# Patient Record
Sex: FEMALE | URBAN - METROPOLITAN AREA
[De-identification: names, ages, dates, MRNs, and addresses within clinical notes are randomized per-mention and may not be internally consistent; named-entity substitution may affect disease eponyms.]

---

## 2021-10-26 ENCOUNTER — APPOINTMENT (OUTPATIENT)
Age: 86
Setting detail: DERMATOLOGY
End: 2021-10-26

## 2021-10-26 PROCEDURE — OTHER MIPS QUALITY: OTHER

## 2021-10-26 NOTE — PROCEDURE: MIPS QUALITY
Quality 47: Advance Care Plan: Advance Care Planning discussed and documented; advance care plan or surrogate decision maker documented in the medical record.
Quality 110: Preventive Care And Screening: Influenza Immunization: Influenza Immunization Administered during Influenza season
Quality 154 Part A: Falls: Risk Assessment (Should Be Reported With Measure 155.): Falls risk assessment completed and documented in the past 12 months.
Quality 226: Preventive Care And Screening: Tobacco Use: Screening And Cessation Intervention: Patient screened for tobacco use and is an ex/non-smoker
Quality 130: Documentation Of Current Medications In The Medical Record: Current Medications Documented
Detail Level: Detailed
Quality 431: Preventive Care And Screening: Unhealthy Alcohol Use - Screening: Patient not identified as an unhealthy alcohol user when screened for unhealthy alcohol use using a systematic screening method
Quality 111:Pneumonia Vaccination Status For Older Adults: Pneumococcal Vaccination Previously Received

## 2021-10-26 NOTE — HPI: CHANGING MOLE
Has Your Changing Mole Been Treated?: has not been treated
Additional History: BCC on nose removed on 2000.

## 2022-01-28 ENCOUNTER — PREPPED CHART (OUTPATIENT)
Dept: URBAN - METROPOLITAN AREA CLINIC 49 | Facility: CLINIC | Age: 87
End: 2022-01-28

## 2022-01-28 PROBLEM — H35.3221 NEOVASCULAR AMD WITH ACTIVE CNV: Status: STABILIZING | Noted: 2022-01-28

## 2022-01-28 PROBLEM — H35.722: Noted: 2022-01-28

## 2022-01-28 PROBLEM — H35.3112 DRY AMD, INTERMEDIATE DRY STAGE: Noted: 2022-01-28

## 2022-01-28 PROBLEM — H35.3112 DRY AMD, INTERMEDIATE DRY STAGE: Status: STABILIZING | Noted: 2022-01-28

## 2022-01-28 PROBLEM — H43.813 POSTERIOR VITREOUS DETACHMENT: Noted: 2022-01-28

## 2022-01-28 PROBLEM — H35.372 EPIRETINAL MEMBRANE: Noted: 2022-01-28

## 2022-01-28 PROBLEM — H35.3221 NEOVASCULAR AMD WITH ACTIVE CNV: Noted: 2022-01-28

## 2022-01-28 PROBLEM — H35.722 PIGMENT EPITHELIAL DETACHMENT: Noted: 2022-01-28

## 2022-02-28 ASSESSMENT — VISUAL ACUITY
OS_PH: 20/80+2
OS_CC: 20/100+2
OD_CC: 20/20-1

## 2022-02-28 ASSESSMENT — TONOMETRY
OD_IOP_MMHG: 17
OS_IOP_MMHG: 17

## 2022-03-04 ENCOUNTER — FOLLOW UP (OUTPATIENT)
Dept: URBAN - METROPOLITAN AREA CLINIC 49 | Facility: CLINIC | Age: 87
End: 2022-03-04

## 2022-03-04 DIAGNOSIS — H35.3112: ICD-10-CM

## 2022-03-04 DIAGNOSIS — H35.3221: ICD-10-CM

## 2022-03-04 PROCEDURE — 92014 COMPRE OPH EXAM EST PT 1/>: CPT | Mod: 25

## 2022-03-04 PROCEDURE — 92134 CPTRZ OPH DX IMG PST SGM RTA: CPT

## 2022-03-04 PROCEDURE — 67028 INJECTION EYE DRUG: CPT

## 2022-03-04 ASSESSMENT — TONOMETRY
OD_IOP_MMHG: 16
OS_IOP_MMHG: 14

## 2022-03-04 ASSESSMENT — VISUAL ACUITY
OD_CC: 20/20
OS_SC: 20/70-1
OS_CC: 20/60-1
OD_SC: 20/25+2

## 2022-04-01 ENCOUNTER — INJECTION ONLY (OUTPATIENT)
Dept: URBAN - METROPOLITAN AREA CLINIC 49 | Facility: CLINIC | Age: 87
End: 2022-04-01

## 2022-04-01 DIAGNOSIS — H35.3112: ICD-10-CM

## 2022-04-01 DIAGNOSIS — H35.3221: ICD-10-CM

## 2022-04-01 PROCEDURE — 67028 INJECTION EYE DRUG: CPT

## 2022-04-01 PROCEDURE — 92134 CPTRZ OPH DX IMG PST SGM RTA: CPT

## 2022-04-01 ASSESSMENT — VISUAL ACUITY
OS_PH: 20/50-2
OD_PH: 20/20-1
OS_SC: 20/70-1
OD_SC: 20/30-1

## 2022-04-01 ASSESSMENT — TONOMETRY
OD_IOP_MMHG: 18
OS_IOP_MMHG: 14

## 2022-04-29 ENCOUNTER — FOLLOW UP (OUTPATIENT)
Dept: URBAN - METROPOLITAN AREA CLINIC 49 | Facility: CLINIC | Age: 87
End: 2022-04-29

## 2022-04-29 DIAGNOSIS — H35.3221: ICD-10-CM

## 2022-04-29 DIAGNOSIS — H35.3112: ICD-10-CM

## 2022-04-29 DIAGNOSIS — H35.722: ICD-10-CM

## 2022-04-29 DIAGNOSIS — H35.372: ICD-10-CM

## 2022-04-29 PROCEDURE — 67028 INJECTION EYE DRUG: CPT

## 2022-04-29 PROCEDURE — 92202 OPSCPY EXTND ON/MAC DRAW: CPT | Mod: 59

## 2022-04-29 PROCEDURE — 92014 COMPRE OPH EXAM EST PT 1/>: CPT | Mod: 25

## 2022-04-29 PROCEDURE — PFS EYLEA PFS

## 2022-04-29 PROCEDURE — 92134 CPTRZ OPH DX IMG PST SGM RTA: CPT

## 2022-04-29 ASSESSMENT — TONOMETRY
OD_IOP_MMHG: 17
OS_IOP_MMHG: 16

## 2022-04-29 ASSESSMENT — VISUAL ACUITY
OS_CC: 20/60+1
OD_CC: 20/25-1

## 2022-05-27 ENCOUNTER — PROCEDURE ONLY (OUTPATIENT)
Dept: URBAN - METROPOLITAN AREA CLINIC 49 | Facility: CLINIC | Age: 87
End: 2022-05-27

## 2022-05-27 DIAGNOSIS — H35.3221: ICD-10-CM

## 2022-05-27 DIAGNOSIS — H35.3112: ICD-10-CM

## 2022-05-27 PROCEDURE — PFS EYLEA PFS

## 2022-05-27 PROCEDURE — 67028 INJECTION EYE DRUG: CPT

## 2022-05-27 PROCEDURE — 92134 CPTRZ OPH DX IMG PST SGM RTA: CPT

## 2022-05-27 ASSESSMENT — TONOMETRY
OD_IOP_MMHG: 12
OS_IOP_MMHG: 11

## 2022-05-27 ASSESSMENT — VISUAL ACUITY
OD_CC: 20/25-2
OS_CC: 20/60-2

## 2022-06-27 ENCOUNTER — FOLLOW UP (OUTPATIENT)
Dept: URBAN - METROPOLITAN AREA CLINIC 49 | Facility: CLINIC | Age: 87
End: 2022-06-27

## 2022-06-27 DIAGNOSIS — H35.3112: ICD-10-CM

## 2022-06-27 DIAGNOSIS — H35.722: ICD-10-CM

## 2022-06-27 DIAGNOSIS — H35.3221: ICD-10-CM

## 2022-06-27 DIAGNOSIS — H35.372: ICD-10-CM

## 2022-06-27 DIAGNOSIS — H43.813: ICD-10-CM

## 2022-06-27 PROCEDURE — 92014 COMPRE OPH EXAM EST PT 1/>: CPT | Mod: 25

## 2022-06-27 PROCEDURE — PFS EYLEA PFS

## 2022-06-27 PROCEDURE — 67028 INJECTION EYE DRUG: CPT

## 2022-06-27 PROCEDURE — 92134 CPTRZ OPH DX IMG PST SGM RTA: CPT

## 2022-06-27 ASSESSMENT — TONOMETRY
OS_IOP_MMHG: 14
OD_IOP_MMHG: 15

## 2022-06-27 ASSESSMENT — VISUAL ACUITY
OS_CC: 20/200
OD_CC: 20/40+2

## 2022-08-05 ENCOUNTER — FOLLOW UP (OUTPATIENT)
Dept: URBAN - METROPOLITAN AREA CLINIC 49 | Facility: CLINIC | Age: 87
End: 2022-08-05

## 2022-08-05 DIAGNOSIS — H35.3112: ICD-10-CM

## 2022-08-05 DIAGNOSIS — H35.372: ICD-10-CM

## 2022-08-05 DIAGNOSIS — H43.813: ICD-10-CM

## 2022-08-05 DIAGNOSIS — H35.3221: ICD-10-CM

## 2022-08-05 DIAGNOSIS — H35.722: ICD-10-CM

## 2022-08-05 PROCEDURE — 92014 COMPRE OPH EXAM EST PT 1/>: CPT | Mod: 25

## 2022-08-05 PROCEDURE — 67028 INJECTION EYE DRUG: CPT

## 2022-08-05 PROCEDURE — 92202 OPSCPY EXTND ON/MAC DRAW: CPT | Mod: 59

## 2022-08-05 PROCEDURE — 92134 CPTRZ OPH DX IMG PST SGM RTA: CPT

## 2022-08-05 PROCEDURE — PFS EYLEA PFS

## 2022-08-05 ASSESSMENT — VISUAL ACUITY
OS_CC: 20/70-2
OD_CC: 20/40

## 2022-08-05 ASSESSMENT — TONOMETRY
OS_IOP_MMHG: 14
OD_IOP_MMHG: 17

## 2022-09-30 ENCOUNTER — FOLLOW UP (OUTPATIENT)
Dept: URBAN - METROPOLITAN AREA CLINIC 49 | Facility: CLINIC | Age: 87
End: 2022-09-30

## 2022-09-30 DIAGNOSIS — H35.3112: ICD-10-CM

## 2022-09-30 DIAGNOSIS — H35.722: ICD-10-CM

## 2022-09-30 DIAGNOSIS — H43.813: ICD-10-CM

## 2022-09-30 DIAGNOSIS — H35.3221: ICD-10-CM

## 2022-09-30 DIAGNOSIS — H35.372: ICD-10-CM

## 2022-09-30 PROCEDURE — 92014 COMPRE OPH EXAM EST PT 1/>: CPT | Mod: 25

## 2022-09-30 PROCEDURE — 92202 OPSCPY EXTND ON/MAC DRAW: CPT | Mod: 59

## 2022-09-30 PROCEDURE — PFS EYLEA PFS

## 2022-09-30 PROCEDURE — 92134 CPTRZ OPH DX IMG PST SGM RTA: CPT

## 2022-09-30 PROCEDURE — 67028 INJECTION EYE DRUG: CPT

## 2022-09-30 ASSESSMENT — TONOMETRY
OS_IOP_MMHG: 15
OD_IOP_MMHG: 16

## 2022-09-30 ASSESSMENT — VISUAL ACUITY
OS_CC: 20/70-2
OD_CC: 20/30-2

## 2022-12-02 ENCOUNTER — INJECTION ONLY (OUTPATIENT)
Dept: URBAN - METROPOLITAN AREA CLINIC 49 | Facility: CLINIC | Age: 87
End: 2022-12-02

## 2022-12-02 DIAGNOSIS — H35.3112: ICD-10-CM

## 2022-12-02 DIAGNOSIS — H35.3221: ICD-10-CM

## 2022-12-02 PROCEDURE — 67028 INJECTION EYE DRUG: CPT

## 2022-12-02 PROCEDURE — PFS EYLEA PFS

## 2022-12-02 PROCEDURE — 92134 CPTRZ OPH DX IMG PST SGM RTA: CPT

## 2022-12-02 ASSESSMENT — VISUAL ACUITY
OS_CC: 20/70-2
OD_CC: 20/30

## 2022-12-02 ASSESSMENT — TONOMETRY
OD_IOP_MMHG: 15
OS_IOP_MMHG: 13

## 2023-02-24 ENCOUNTER — FOLLOW UP (OUTPATIENT)
Dept: URBAN - METROPOLITAN AREA CLINIC 49 | Facility: CLINIC | Age: 88
End: 2023-02-24

## 2023-02-24 DIAGNOSIS — H43.813: ICD-10-CM

## 2023-02-24 DIAGNOSIS — H35.722: ICD-10-CM

## 2023-02-24 DIAGNOSIS — H35.3112: ICD-10-CM

## 2023-02-24 DIAGNOSIS — H35.372: ICD-10-CM

## 2023-02-24 DIAGNOSIS — H35.3221: ICD-10-CM

## 2023-02-24 PROCEDURE — 67028 INJECTION EYE DRUG: CPT

## 2023-02-24 PROCEDURE — PFS EYLEA PFS

## 2023-02-24 PROCEDURE — 92134 CPTRZ OPH DX IMG PST SGM RTA: CPT

## 2023-02-24 PROCEDURE — 92014 COMPRE OPH EXAM EST PT 1/>: CPT | Mod: 25

## 2023-02-24 PROCEDURE — 92202 OPSCPY EXTND ON/MAC DRAW: CPT | Mod: 59

## 2023-02-24 ASSESSMENT — TONOMETRY
OD_IOP_MMHG: 18
OS_IOP_MMHG: 16

## 2023-02-24 ASSESSMENT — VISUAL ACUITY
OD_CC: 20/30
OS_CC: 20/70

## 2023-06-09 ENCOUNTER — FOLLOW UP (OUTPATIENT)
Dept: URBAN - METROPOLITAN AREA CLINIC 49 | Facility: CLINIC | Age: 88
End: 2023-06-09

## 2023-06-09 DIAGNOSIS — H35.3221: ICD-10-CM

## 2023-06-09 DIAGNOSIS — H35.372: ICD-10-CM

## 2023-06-09 DIAGNOSIS — H35.722: ICD-10-CM

## 2023-06-09 DIAGNOSIS — H35.3112: ICD-10-CM

## 2023-06-09 DIAGNOSIS — H43.813: ICD-10-CM

## 2023-06-09 PROCEDURE — 92134 CPTRZ OPH DX IMG PST SGM RTA: CPT

## 2023-06-09 PROCEDURE — 92014 COMPRE OPH EXAM EST PT 1/>: CPT | Mod: 25

## 2023-06-09 PROCEDURE — 92202 OPSCPY EXTND ON/MAC DRAW: CPT | Mod: 59

## 2023-06-09 ASSESSMENT — VISUAL ACUITY
OD_CC: 20/20-2
OS_CC: 20/50-2

## 2023-06-09 ASSESSMENT — TONOMETRY
OS_IOP_MMHG: 16
OD_IOP_MMHG: 16

## 2023-08-04 ENCOUNTER — FOLLOW UP (OUTPATIENT)
Dept: URBAN - METROPOLITAN AREA CLINIC 49 | Facility: CLINIC | Age: 88
End: 2023-08-04

## 2023-08-04 DIAGNOSIS — H35.372: ICD-10-CM

## 2023-08-04 DIAGNOSIS — Z96.1: ICD-10-CM

## 2023-08-04 DIAGNOSIS — H35.3221: ICD-10-CM

## 2023-08-04 DIAGNOSIS — H43.813: ICD-10-CM

## 2023-08-04 DIAGNOSIS — H35.3112: ICD-10-CM

## 2023-08-04 DIAGNOSIS — H35.722: ICD-10-CM

## 2023-08-04 PROCEDURE — 92014 COMPRE OPH EXAM EST PT 1/>: CPT

## 2023-08-04 PROCEDURE — 92134 CPTRZ OPH DX IMG PST SGM RTA: CPT

## 2023-08-04 PROCEDURE — 92202 OPSCPY EXTND ON/MAC DRAW: CPT

## 2023-08-04 ASSESSMENT — VISUAL ACUITY
OS_CC: 20/60+1
OD_CC: 20/20-2

## 2023-08-04 ASSESSMENT — TONOMETRY
OD_IOP_MMHG: 12
OS_IOP_MMHG: 11

## 2023-10-27 ENCOUNTER — FOLLOW UP (OUTPATIENT)
Dept: URBAN - METROPOLITAN AREA CLINIC 49 | Facility: CLINIC | Age: 88
End: 2023-10-27

## 2023-10-27 DIAGNOSIS — H43.813: ICD-10-CM

## 2023-10-27 DIAGNOSIS — H35.372: ICD-10-CM

## 2023-10-27 DIAGNOSIS — H35.722: ICD-10-CM

## 2023-10-27 DIAGNOSIS — H35.3221: ICD-10-CM

## 2023-10-27 DIAGNOSIS — H35.3112: ICD-10-CM

## 2023-10-27 PROCEDURE — 92134 CPTRZ OPH DX IMG PST SGM RTA: CPT

## 2023-10-27 PROCEDURE — 92202 OPSCPY EXTND ON/MAC DRAW: CPT

## 2023-10-27 PROCEDURE — 92014 COMPRE OPH EXAM EST PT 1/>: CPT

## 2023-10-27 ASSESSMENT — TONOMETRY
OS_IOP_MMHG: 16
OD_IOP_MMHG: 15

## 2023-10-27 ASSESSMENT — VISUAL ACUITY
OS_CC: 20/60-1
OD_CC: 20/25+2

## 2024-01-26 ENCOUNTER — FOLLOW UP (OUTPATIENT)
Dept: URBAN - METROPOLITAN AREA CLINIC 49 | Facility: CLINIC | Age: 89
End: 2024-01-26

## 2024-01-26 DIAGNOSIS — H35.722: ICD-10-CM

## 2024-01-26 DIAGNOSIS — H43.813: ICD-10-CM

## 2024-01-26 DIAGNOSIS — H35.372: ICD-10-CM

## 2024-01-26 DIAGNOSIS — H35.3112: ICD-10-CM

## 2024-01-26 DIAGNOSIS — H35.3221: ICD-10-CM

## 2024-01-26 PROCEDURE — 92134 CPTRZ OPH DX IMG PST SGM RTA: CPT

## 2024-01-26 PROCEDURE — 92202 OPSCPY EXTND ON/MAC DRAW: CPT

## 2024-01-26 PROCEDURE — 92014 COMPRE OPH EXAM EST PT 1/>: CPT

## 2024-01-26 ASSESSMENT — TONOMETRY
OS_IOP_MMHG: 15
OD_IOP_MMHG: 16

## 2024-01-26 ASSESSMENT — VISUAL ACUITY
OD_CC: 20/30
OS_CC: 20/80

## 2024-05-31 ENCOUNTER — FOLLOW UP (OUTPATIENT)
Dept: URBAN - METROPOLITAN AREA CLINIC 49 | Facility: CLINIC | Age: 89
End: 2024-05-31

## 2024-05-31 DIAGNOSIS — H35.372: ICD-10-CM

## 2024-05-31 DIAGNOSIS — H35.722: ICD-10-CM

## 2024-05-31 DIAGNOSIS — H35.3112: ICD-10-CM

## 2024-05-31 DIAGNOSIS — H43.813: ICD-10-CM

## 2024-05-31 DIAGNOSIS — H35.3221: ICD-10-CM

## 2024-05-31 PROCEDURE — 92014 COMPRE OPH EXAM EST PT 1/>: CPT

## 2024-05-31 PROCEDURE — 92202 OPSCPY EXTND ON/MAC DRAW: CPT

## 2024-05-31 PROCEDURE — 92134 CPTRZ OPH DX IMG PST SGM RTA: CPT

## 2024-05-31 ASSESSMENT — VISUAL ACUITY
OS_CC: 20/80-1
OD_CC: 20/30
OS_PH: 20/70-1

## 2024-05-31 ASSESSMENT — TONOMETRY
OD_IOP_MMHG: 16
OS_IOP_MMHG: 16

## 2024-09-27 ENCOUNTER — FOLLOW UP (OUTPATIENT)
Dept: URBAN - METROPOLITAN AREA CLINIC 49 | Facility: CLINIC | Age: 89
End: 2024-09-27

## 2024-09-27 DIAGNOSIS — H35.3112: ICD-10-CM

## 2024-09-27 DIAGNOSIS — H43.813: ICD-10-CM

## 2024-09-27 DIAGNOSIS — H35.372: ICD-10-CM

## 2024-09-27 DIAGNOSIS — H35.3221: ICD-10-CM

## 2024-09-27 DIAGNOSIS — H35.722: ICD-10-CM

## 2024-09-27 PROCEDURE — 92014 COMPRE OPH EXAM EST PT 1/>: CPT

## 2024-09-27 PROCEDURE — 92202 OPSCPY EXTND ON/MAC DRAW: CPT

## 2024-09-27 PROCEDURE — 92134 CPTRZ OPH DX IMG PST SGM RTA: CPT

## 2024-09-27 ASSESSMENT — TONOMETRY
OD_IOP_MMHG: 14
OS_IOP_MMHG: 14

## 2024-09-27 ASSESSMENT — VISUAL ACUITY
OS_CC: 20/70-2
OD_CC: 20/40-1
OD_PH: 20/30-2

## 2025-03-14 ENCOUNTER — FOLLOW UP (OUTPATIENT)
Dept: URBAN - METROPOLITAN AREA CLINIC 49 | Facility: CLINIC | Age: OVER 89
End: 2025-03-14

## 2025-03-14 DIAGNOSIS — H35.3221: ICD-10-CM

## 2025-03-14 DIAGNOSIS — H35.3112: ICD-10-CM

## 2025-03-14 PROCEDURE — 92134 CPTRZ OPH DX IMG PST SGM RTA: CPT

## 2025-03-14 PROCEDURE — 92202 OPSCPY EXTND ON/MAC DRAW: CPT

## 2025-03-14 PROCEDURE — 92014 COMPRE OPH EXAM EST PT 1/>: CPT

## 2025-03-14 ASSESSMENT — TONOMETRY
OS_IOP_MMHG: 16
OD_IOP_MMHG: 15

## 2025-03-14 ASSESSMENT — VISUAL ACUITY
OS_CC: 20/70
OD_CC: 20/30-2